# Patient Record
Sex: FEMALE | Race: WHITE | NOT HISPANIC OR LATINO | Employment: OTHER | URBAN - METROPOLITAN AREA
[De-identification: names, ages, dates, MRNs, and addresses within clinical notes are randomized per-mention and may not be internally consistent; named-entity substitution may affect disease eponyms.]

---

## 2017-01-04 ENCOUNTER — OFFICE VISIT (OUTPATIENT)
Dept: ONCOLOGY | Facility: CLINIC | Age: 82
End: 2017-01-04

## 2017-01-04 ENCOUNTER — APPOINTMENT (OUTPATIENT)
Dept: ONCOLOGY | Facility: HOSPITAL | Age: 82
End: 2017-01-04

## 2017-01-04 ENCOUNTER — LAB (OUTPATIENT)
Dept: LAB | Facility: HOSPITAL | Age: 82
End: 2017-01-04

## 2017-01-04 VITALS
WEIGHT: 143.4 LBS | OXYGEN SATURATION: 95 % | TEMPERATURE: 97.9 F | HEART RATE: 86 BPM | BODY MASS INDEX: 30.94 KG/M2 | HEIGHT: 57 IN | SYSTOLIC BLOOD PRESSURE: 128 MMHG | DIASTOLIC BLOOD PRESSURE: 70 MMHG | RESPIRATION RATE: 16 BRPM

## 2017-01-04 DIAGNOSIS — E61.1 IRON DEFICIENCY: Primary | ICD-10-CM

## 2017-01-04 DIAGNOSIS — N18.9 CKD (CHRONIC KIDNEY DISEASE), UNSPECIFIED STAGE: ICD-10-CM

## 2017-01-04 LAB
BASOPHILS # BLD AUTO: 0.07 10*3/MM3 (ref 0–0.1)
BASOPHILS NFR BLD AUTO: 1.2 % (ref 0–1.1)
DEPRECATED RDW RBC AUTO: 50.1 FL (ref 37–49)
EOSINOPHIL # BLD AUTO: 0.26 10*3/MM3 (ref 0–0.36)
EOSINOPHIL NFR BLD AUTO: 4.4 % (ref 1–5)
ERYTHROCYTE [DISTWIDTH] IN BLOOD BY AUTOMATED COUNT: 13.5 % (ref 11.7–14.5)
HCT VFR BLD AUTO: 30.4 % (ref 34–45)
HGB BLD-MCNC: 10.1 G/DL (ref 11.5–14.9)
IMM GRANULOCYTES # BLD: 0.02 10*3/MM3 (ref 0–0.03)
IMM GRANULOCYTES NFR BLD: 0.3 % (ref 0–0.5)
LYMPHOCYTES # BLD AUTO: 1.35 10*3/MM3 (ref 1–3.5)
LYMPHOCYTES NFR BLD AUTO: 23 % (ref 20–49)
MCH RBC QN AUTO: 33.4 PG (ref 27–33)
MCHC RBC AUTO-ENTMCNC: 33.2 G/DL (ref 32–35)
MCV RBC AUTO: 100.7 FL (ref 83–97)
MONOCYTES # BLD AUTO: 0.47 10*3/MM3 (ref 0.25–0.8)
MONOCYTES NFR BLD AUTO: 8 % (ref 4–12)
NEUTROPHILS # BLD AUTO: 3.69 10*3/MM3 (ref 1.5–7)
NEUTROPHILS NFR BLD AUTO: 63.1 % (ref 39–75)
NRBC BLD MANUAL-RTO: 0 /100 WBC (ref 0–0)
PLATELET # BLD AUTO: 225 10*3/MM3 (ref 150–375)
PMV BLD AUTO: 9.8 FL (ref 8.9–12.1)
RBC # BLD AUTO: 3.02 10*6/MM3 (ref 3.9–5)
WBC NRBC COR # BLD: 5.86 10*3/MM3 (ref 4–10)

## 2017-01-04 PROCEDURE — 99213 OFFICE O/P EST LOW 20 MIN: CPT | Performed by: INTERNAL MEDICINE

## 2017-01-04 PROCEDURE — 85025 COMPLETE CBC W/AUTO DIFF WBC: CPT

## 2017-01-04 PROCEDURE — 36416 COLLJ CAPILLARY BLOOD SPEC: CPT

## 2017-01-04 NOTE — PROGRESS NOTES
DOS: 10/14/16  Subjective     REASONS FOR FOLLOW-UP: Multifactorial anemia secondary to Stage III chronic kidney disease and iron deficiency.  On intermittent Procrit.  Receives IV iron replacement as needed.    HISTORY OF PRESENT ILLNESS:     History of Present Illness     Mr. Blackburn is a pleasant 86 year old female who is here today for follow-up.  Recently her ferritin was found to be 64 and therefore she required Feraheme once again.  Her repeat ferritin level done 8/9/16 had improved up to 589.    She returns today accompanied by her daughter. Her main complaint is ongoing knee pain. She has significant arthritis in her knees and is followed by Dr. Young.    We reviewed her lab work today showing Hgb of 10.1.  She had been on monthly procrit.  However her last dose of Procrit has been on 9/17/2016.  Her hemoglobin though is gradually dropping from 11-10.6-10.1.  We will continue to schedule her for every 6 weeks Procrit if needed.  Her ferritin has been normal.    Past Medical History, Past Surgical History, Social History, Family History have been reviewed and are without significant changes except as mentioned.    Review of Systems   Constitutional: Positive for fatigue.   HENT: Negative.    Eyes: Negative.    Respiratory: Negative.    Cardiovascular: Negative.    Gastrointestinal: Negative.    Endocrine: Negative.    Genitourinary: Negative.    Musculoskeletal: Positive for arthralgias and joint swelling.   Skin: Negative.    Allergic/Immunologic: Negative.    Neurological: Negative.    Hematological: Negative.    Psychiatric/Behavioral: Negative.       A comprehensive 14 point review of systems was performed and was negative except as mentioned.    Medications:  The current medication list was reviewed in the EMR    ALLERGIES:  No Known Allergies    Objective      Vitals:    01/04/17 1033   BP: 128/70   Pulse: 86   Resp: 16   Temp: 97.9 °F (36.6 °C)   TempSrc: Oral   SpO2: 95%   Weight: 143 lb 6.4  "oz (65 kg)   Height: 57.28\" (145.5 cm)   PainSc: 0-No pain     Current Status 1/4/2017   ECOG score 0       Physical Exam   Constitutional: She is oriented to person, place, and time. She appears well-developed and well-nourished.   Seen today with daughter using walker ambulation.   Eyes: Conjunctivae and EOM are normal. Pupils are equal, round, and reactive to light.   Neck: Normal range of motion. Neck supple.   Cardiovascular: Normal rate and regular rhythm.    Murmur heard.   Systolic murmur is present with a grade of 2/6   Pulmonary/Chest: Effort normal and breath sounds normal.   Abdominal: Soft. Bowel sounds are normal. She exhibits no mass. There is no tenderness. There is no guarding.   Musculoskeletal: Normal range of motion.   Neurological: She is alert and oriented to person, place, and time.   Skin: Skin is warm and dry. No rash noted.   Psychiatric: She has a normal mood and affect. Her behavior is normal.   Nursing note and vitals reviewed.        RECENT LABS:  Hematology WBC   Date Value Ref Range Status   01/04/2017 5.86 4.00 - 10.00 10*3/mm3 Final     RBC   Date Value Ref Range Status   01/04/2017 3.02 (L) 3.90 - 5.00 10*6/mm3 Final     HEMOGLOBIN   Date Value Ref Range Status   01/04/2017 10.1 (L) 11.5 - 14.9 g/dL Final     HEMATOCRIT   Date Value Ref Range Status   01/04/2017 30.4 (L) 34.0 - 45.0 % Final     PLATELETS   Date Value Ref Range Status   01/04/2017 225 150 - 375 10*3/mm3 Final          Assessment/Plan    Assessment:  1. Multifactorial anemia with anemia of Stage III chronic kidney disease and iron deficiency. Currently receiving Procrit on a monthly basis per protocol, typical dose 8000 units.today we will hold procrit given hg of 10.0.  Last dose of Procrit received 9/17/2016.  We will continue to keep her on Procrit every 6 weeks if eligible.    2.  Recurrent iron deficiency, now resolved.     3.  Fatigue related to anemia, chronic and stable.    Plan:  1. Hold Procrit  today.  2.  " Patient to return in 6 weeks for M.D. follow-up and possible Procrit.  3. Terrence serrato in 3 months.        Karrie Morris MD        1/4/2017

## 2017-02-16 ENCOUNTER — APPOINTMENT (OUTPATIENT)
Dept: ONCOLOGY | Facility: HOSPITAL | Age: 82
End: 2017-02-16

## 2017-02-16 ENCOUNTER — LAB (OUTPATIENT)
Dept: LAB | Facility: HOSPITAL | Age: 82
End: 2017-02-16

## 2017-02-16 ENCOUNTER — OFFICE VISIT (OUTPATIENT)
Dept: ONCOLOGY | Facility: CLINIC | Age: 82
End: 2017-02-16

## 2017-02-16 VITALS
BODY MASS INDEX: 30.29 KG/M2 | HEIGHT: 57 IN | OXYGEN SATURATION: 99 % | HEART RATE: 74 BPM | WEIGHT: 140.4 LBS | RESPIRATION RATE: 16 BRPM | DIASTOLIC BLOOD PRESSURE: 80 MMHG | SYSTOLIC BLOOD PRESSURE: 130 MMHG | TEMPERATURE: 98 F

## 2017-02-16 DIAGNOSIS — D50.9 IRON DEFICIENCY ANEMIA, UNSPECIFIED IRON DEFICIENCY ANEMIA TYPE: Primary | ICD-10-CM

## 2017-02-16 DIAGNOSIS — N18.9 CHRONIC KIDNEY DISEASE, UNSPECIFIED STAGE: ICD-10-CM

## 2017-02-16 LAB
BASOPHILS # BLD AUTO: 0.06 10*3/MM3 (ref 0–0.1)
BASOPHILS NFR BLD AUTO: 0.7 % (ref 0–1.1)
DEPRECATED RDW RBC AUTO: 50.5 FL (ref 37–49)
EOSINOPHIL # BLD AUTO: 0.56 10*3/MM3 (ref 0–0.36)
EOSINOPHIL NFR BLD AUTO: 7 % (ref 1–5)
ERYTHROCYTE [DISTWIDTH] IN BLOOD BY AUTOMATED COUNT: 13.5 % (ref 11.7–14.5)
HCT VFR BLD AUTO: 30.1 % (ref 34–45)
HGB BLD-MCNC: 10 G/DL (ref 11.5–14.9)
IMM GRANULOCYTES # BLD: 0.04 10*3/MM3 (ref 0–0.03)
IMM GRANULOCYTES NFR BLD: 0.5 % (ref 0–0.5)
LYMPHOCYTES # BLD AUTO: 1.71 10*3/MM3 (ref 1–3.5)
LYMPHOCYTES NFR BLD AUTO: 21.2 % (ref 20–49)
MCH RBC QN AUTO: 33.8 PG (ref 27–33)
MCHC RBC AUTO-ENTMCNC: 33.2 G/DL (ref 32–35)
MCV RBC AUTO: 101.7 FL (ref 83–97)
MONOCYTES # BLD AUTO: 0.77 10*3/MM3 (ref 0.25–0.8)
MONOCYTES NFR BLD AUTO: 9.6 % (ref 4–12)
NEUTROPHILS # BLD AUTO: 4.91 10*3/MM3 (ref 1.5–7)
NEUTROPHILS NFR BLD AUTO: 61 % (ref 39–75)
NRBC BLD MANUAL-RTO: 0 /100 WBC (ref 0–0)
PLATELET # BLD AUTO: 250 10*3/MM3 (ref 150–375)
PMV BLD AUTO: 10 FL (ref 8.9–12.1)
RBC # BLD AUTO: 2.96 10*6/MM3 (ref 3.9–5)
WBC NRBC COR # BLD: 8.05 10*3/MM3 (ref 4–10)

## 2017-02-16 PROCEDURE — 36416 COLLJ CAPILLARY BLOOD SPEC: CPT

## 2017-02-16 PROCEDURE — 85025 COMPLETE CBC W/AUTO DIFF WBC: CPT

## 2017-02-16 PROCEDURE — 99213 OFFICE O/P EST LOW 20 MIN: CPT | Performed by: NURSE PRACTITIONER

## 2017-02-16 NOTE — PROGRESS NOTES
"      DOS: 10/14/16  Subjective     REASONS FOR FOLLOW-UP: Multifactorial anemia secondary to Stage III chronic kidney disease and iron deficiency.  On intermittent Procrit.  Receives IV iron replacement as needed.    HISTORY OF PRESENT ILLNESS:     History of Present Illness     Mr. Blackburn is a pleasant 86 year old female who is here today for follow-up of the above-mentioned history, accompanied by her daughter. Her main complaint is chronic, osteoarthritic knee pain.  This is managed by Dr. Young.  She receives Prolia for treatment of osteoporosis every 6 months.  Otherwise, she feels quite well.  She denies shortness of breath, chest pain, or excessive bleeding or bruising.  She remains chronically fatigued.        Past Medical History, Past Surgical History, Social History, Family History have been reviewed and are without significant changes except as mentioned.    Review of Systems   Constitutional: Positive for fatigue.   HENT: Negative.    Eyes: Negative.    Respiratory: Negative.    Cardiovascular: Negative.    Gastrointestinal: Negative.    Endocrine: Negative.    Genitourinary: Negative.    Musculoskeletal: Positive for arthralgias and joint swelling.   Skin: Negative.    Allergic/Immunologic: Negative.    Neurological: Negative.    Hematological: Negative.    Psychiatric/Behavioral: Negative.       A comprehensive 14 point review of systems was performed and was negative except as mentioned.    Medications:  The current medication list was reviewed in the EMR    ALLERGIES:  No Known Allergies    Objective      Vitals:    02/16/17 1330   BP: 130/80   Pulse: 74   Resp: 16   Temp: 98 °F (36.7 °C)   TempSrc: Oral   SpO2: 99%   Weight: 140 lb 6.4 oz (63.7 kg)   Height: 57.28\" (145.5 cm)   PainSc: 0-No pain     Current Status 2/16/2017   ECOG score 1       Physical Exam   Constitutional: She is oriented to person, place, and time. She appears well-developed and well-nourished.   Seen today with daughter " using walker ambulation.   Eyes: Conjunctivae and EOM are normal. Pupils are equal, round, and reactive to light.   Neck: Normal range of motion. Neck supple.   Cardiovascular: Normal rate and regular rhythm.    Murmur heard.   Systolic murmur is present with a grade of 2/6   Pulmonary/Chest: Effort normal and breath sounds normal.   Abdominal: Soft. Bowel sounds are normal. She exhibits no mass. There is no tenderness. There is no guarding.   Musculoskeletal: Normal range of motion.   Neurological: She is alert and oriented to person, place, and time.   Skin: Skin is warm and dry. No rash noted.   Psychiatric: She has a normal mood and affect. Her behavior is normal.   Nursing note and vitals reviewed.        RECENT LABS:  Hematology WBC   Date Value Ref Range Status   02/16/2017 8.05 4.00 - 10.00 10*3/mm3 Final     RBC   Date Value Ref Range Status   02/16/2017 2.96 (L) 3.90 - 5.00 10*6/mm3 Final     HEMOGLOBIN   Date Value Ref Range Status   02/16/2017 10.0 (L) 11.5 - 14.9 g/dL Final     HEMATOCRIT   Date Value Ref Range Status   02/16/2017 30.1 (L) 34.0 - 45.0 % Final     PLATELETS   Date Value Ref Range Status   02/16/2017 250 150 - 375 10*3/mm3 Final          Assessment/Plan    Assessment:  1. Multifactorial anemia with anemia of Stage III chronic kidney disease and iron deficiency. Lab work has been reviewed today and hemoglobin has declined from 11.0 to 10.0.  She has been on every 6 week Procrit.  However, she has not required Procrit since September 6, 2016  as her hemoglobin has remained at or above 10.0.  She, again; will not require Procrit today.  We will continue to monitor a CBC every 6 weeks with the possibility of Procrit at that time. She will see Dr. Morris in follow-up on March 30, 2017, at which time we will also draw iron studies.  She was instructed to call our office with worsening fatigue, shortness of breath, chest pain, or with any other issues or concerns prior to her next office  visit.      2.  Recurrent iron deficiency, now resolved.     3.  Fatigue related to anemia, chronic and stable.  Remains unchanged today.    Plan:  1. Hold Procrit  today.  2.  Patient to return in 6 weeks for M.D. follow-up with Dr. Morris, CBC, and repeat iron studies.   3.  She was instructed to call our office with worsening fatigue, shortness of breath, chest pain, or any other issues of concern prior to her next office visit.        Anh Blankenship, AUDREY        2/16/2017

## 2017-03-30 ENCOUNTER — APPOINTMENT (OUTPATIENT)
Dept: ONCOLOGY | Facility: HOSPITAL | Age: 82
End: 2017-03-30

## 2017-03-30 ENCOUNTER — LAB (OUTPATIENT)
Dept: LAB | Facility: HOSPITAL | Age: 82
End: 2017-03-30

## 2017-03-30 ENCOUNTER — OFFICE VISIT (OUTPATIENT)
Dept: ONCOLOGY | Facility: CLINIC | Age: 82
End: 2017-03-30

## 2017-03-30 VITALS
BODY MASS INDEX: 30.42 KG/M2 | RESPIRATION RATE: 16 BRPM | HEIGHT: 57 IN | HEART RATE: 72 BPM | TEMPERATURE: 98.3 F | WEIGHT: 141 LBS | DIASTOLIC BLOOD PRESSURE: 70 MMHG | SYSTOLIC BLOOD PRESSURE: 120 MMHG

## 2017-03-30 DIAGNOSIS — D50.9 IRON DEFICIENCY ANEMIA, UNSPECIFIED IRON DEFICIENCY ANEMIA TYPE: Primary | ICD-10-CM

## 2017-03-30 DIAGNOSIS — N18.9 CHRONIC KIDNEY DISEASE, UNSPECIFIED STAGE: ICD-10-CM

## 2017-03-30 DIAGNOSIS — D50.9 IRON DEFICIENCY ANEMIA, UNSPECIFIED IRON DEFICIENCY ANEMIA TYPE: ICD-10-CM

## 2017-03-30 DIAGNOSIS — Z86.2 HISTORY OF ANEMIA DUE TO CKD: Primary | ICD-10-CM

## 2017-03-30 DIAGNOSIS — N18.9 HISTORY OF ANEMIA DUE TO CKD: Primary | ICD-10-CM

## 2017-03-30 LAB
ALBUMIN SERPL-MCNC: 4.4 G/DL (ref 3.5–5.2)
ALBUMIN/GLOB SERPL: 1.5 G/DL (ref 1.1–2.4)
ALP SERPL-CCNC: 40 U/L (ref 38–116)
ALT SERPL W P-5'-P-CCNC: 10 U/L (ref 0–33)
ANION GAP SERPL CALCULATED.3IONS-SCNC: 13.3 MMOL/L
AST SERPL-CCNC: 18 U/L (ref 0–32)
BASOPHILS # BLD AUTO: 0.05 10*3/MM3 (ref 0–0.1)
BASOPHILS NFR BLD AUTO: 0.7 % (ref 0–1.1)
BILIRUB SERPL-MCNC: 0.6 MG/DL (ref 0.1–1.2)
BUN BLD-MCNC: 28 MG/DL (ref 6–20)
BUN/CREAT SERPL: 23.5 (ref 7.3–30)
CALCIUM SPEC-SCNC: 10.7 MG/DL (ref 8.5–10.2)
CHLORIDE SERPL-SCNC: 103 MMOL/L (ref 98–107)
CO2 SERPL-SCNC: 22.7 MMOL/L (ref 22–29)
CREAT BLD-MCNC: 1.19 MG/DL (ref 0.6–1.1)
DEPRECATED RDW RBC AUTO: 55.3 FL (ref 37–49)
EOSINOPHIL # BLD AUTO: 0.42 10*3/MM3 (ref 0–0.36)
EOSINOPHIL NFR BLD AUTO: 6.2 % (ref 1–5)
ERYTHROCYTE [DISTWIDTH] IN BLOOD BY AUTOMATED COUNT: 14.6 % (ref 11.7–14.5)
FERRITIN SERPL-MCNC: 319.4 NG/ML
GFR SERPL CREATININE-BSD FRML MDRD: 43 ML/MIN/1.73
GLOBULIN UR ELPH-MCNC: 3 GM/DL (ref 1.8–3.5)
GLUCOSE BLD-MCNC: 102 MG/DL (ref 74–124)
HCT VFR BLD AUTO: 32 % (ref 34–45)
HGB BLD-MCNC: 10.1 G/DL (ref 11.5–14.9)
IMM GRANULOCYTES # BLD: 0.05 10*3/MM3 (ref 0–0.03)
IMM GRANULOCYTES NFR BLD: 0.7 % (ref 0–0.5)
IRON 24H UR-MRATE: 96 MCG/DL (ref 37–145)
IRON SATN MFR SERPL: 22 % (ref 14–48)
LYMPHOCYTES # BLD AUTO: 1.42 10*3/MM3 (ref 1–3.5)
LYMPHOCYTES NFR BLD AUTO: 20.9 % (ref 20–49)
MCH RBC QN AUTO: 32.9 PG (ref 27–33)
MCHC RBC AUTO-ENTMCNC: 31.6 G/DL (ref 32–35)
MCV RBC AUTO: 104.2 FL (ref 83–97)
MONOCYTES # BLD AUTO: 0.54 10*3/MM3 (ref 0.25–0.8)
MONOCYTES NFR BLD AUTO: 7.9 % (ref 4–12)
NEUTROPHILS # BLD AUTO: 4.33 10*3/MM3 (ref 1.5–7)
NEUTROPHILS NFR BLD AUTO: 63.6 % (ref 39–75)
NRBC BLD MANUAL-RTO: 0 /100 WBC (ref 0–0)
PLATELET # BLD AUTO: 285 10*3/MM3 (ref 150–375)
PMV BLD AUTO: 9.4 FL (ref 8.9–12.1)
POTASSIUM BLD-SCNC: 5.6 MMOL/L (ref 3.5–4.7)
PROT SERPL-MCNC: 7.4 G/DL (ref 6.3–8)
RBC # BLD AUTO: 3.07 10*6/MM3 (ref 3.9–5)
SODIUM BLD-SCNC: 139 MMOL/L (ref 134–145)
TIBC SERPL-MCNC: 442 MCG/DL (ref 249–505)
TRANSFERRIN SERPL-MCNC: 316 MG/DL (ref 200–360)
WBC NRBC COR # BLD: 6.81 10*3/MM3 (ref 4–10)

## 2017-03-30 PROCEDURE — 99213 OFFICE O/P EST LOW 20 MIN: CPT | Performed by: INTERNAL MEDICINE

## 2017-03-30 PROCEDURE — 85025 COMPLETE CBC W/AUTO DIFF WBC: CPT

## 2017-03-30 PROCEDURE — 36415 COLL VENOUS BLD VENIPUNCTURE: CPT

## 2017-03-30 PROCEDURE — 84466 ASSAY OF TRANSFERRIN: CPT

## 2017-03-30 PROCEDURE — 82728 ASSAY OF FERRITIN: CPT

## 2017-03-30 PROCEDURE — 80053 COMPREHEN METABOLIC PANEL: CPT

## 2017-03-30 PROCEDURE — 83540 ASSAY OF IRON: CPT

## 2017-03-30 NOTE — PROGRESS NOTES
DOS: 10/14/16  Subjective     REASONS FOR FOLLOW-UP: Multifactorial anemia secondary to Stage III chronic kidney disease and iron deficiency.  On intermittent Procrit.  Receives IV iron replacement as needed.    HISTORY OF PRESENT ILLNESS:     History of Present Illness     Mr. Blackburn is a pleasant 86 year old female who is here today for follow-up.  Recently her ferritin was found to be 64 and therefore she required Feraheme once again.  Her repeat ferritin level done 8/9/16 had improved up to 589.    She returns today accompanied by her daughter. Her main complaint is ongoing knee pain. She has significant arthritis in her knees and is followed by Dr. Young.    We reviewed her lab work today showing Hgb of 10.1.  She had been on monthly procrit.  However her last dose of Procrit has been on 9/17/2016.  Her hemoglobin though is gradually dropping from 11-10.6-10.1.  We will continue to schedule her for every 6 weeks Procrit if needed.  Her ferritin has been normal.  Patient has been on Procrit but hasn't required it for the last few times.  Today her hemoglobin is 10.1 and she will not require it.  Last dose of Procrit was September 2016.    Past Medical History, Past Surgical History, Social History, Family History have been reviewed and are without significant changes except as mentioned.    Review of Systems   Constitutional: Positive for fatigue.   HENT: Negative.    Eyes: Negative.    Respiratory: Negative.    Cardiovascular: Negative.    Gastrointestinal: Negative.    Endocrine: Negative.    Genitourinary: Negative.    Musculoskeletal: Positive for arthralgias and joint swelling.   Skin: Negative.    Allergic/Immunologic: Negative.    Neurological: Negative.    Hematological: Negative.    Psychiatric/Behavioral: Negative.       A comprehensive 14 point review of systems was performed and was negative except as mentioned.    Medications:  The current medication list was reviewed in the  "EMR    ALLERGIES:  No Known Allergies    Objective      Vitals:    03/30/17 1407   BP: 120/70   Pulse: 72   Resp: 16   Temp: 98.3 °F (36.8 °C)   Weight: 141 lb (64 kg)   Height: 57.28\" (145.5 cm)   PainSc: 0-No pain     Current Status 3/30/2017   ECOG score 0       Physical Exam   Constitutional: She is oriented to person, place, and time. She appears well-developed and well-nourished.   Seen today with daughter using walker ambulation.   Eyes: Conjunctivae and EOM are normal. Pupils are equal, round, and reactive to light.   Neck: Normal range of motion. Neck supple.   Cardiovascular: Normal rate and regular rhythm.    Murmur heard.   Systolic murmur is present with a grade of 2/6   Pulmonary/Chest: Effort normal and breath sounds normal.   Abdominal: Soft. Bowel sounds are normal. She exhibits no mass. There is no tenderness. There is no guarding.   Musculoskeletal: Normal range of motion.   Neurological: She is alert and oriented to person, place, and time.   Skin: Skin is warm and dry. No rash noted.   Psychiatric: She has a normal mood and affect. Her behavior is normal.   Nursing note and vitals reviewed.        RECENT LABS:  Hematology WBC   Date Value Ref Range Status   03/30/2017 6.81 4.00 - 10.00 10*3/mm3 Final     RBC   Date Value Ref Range Status   03/30/2017 3.07 (L) 3.90 - 5.00 10*6/mm3 Final     Hemoglobin   Date Value Ref Range Status   03/30/2017 10.1 (L) 11.5 - 14.9 g/dL Final     Hematocrit   Date Value Ref Range Status   03/30/2017 32.0 (L) 34.0 - 45.0 % Final     Platelets   Date Value Ref Range Status   03/30/2017 285 150 - 375 10*3/mm3 Final          Assessment/Plan    Assessment:  1. Multifactorial anemia with anemia of Stage III chronic kidney disease and iron deficiency. Currently receiving Procrit on a monthly basis per protocol, typical dose 8000 units.today we will hold procrit given hg of 10.0.  Last dose of Procrit received 9/17/2016.  We will continue to keep her on Procrit every 6 " weeks if eligible.  If in the next 3 months she's not eligible for any Procrit administration because of hemoglobin being about 10 we can stop giving Procrit.    2.  Recurrent iron deficiency, now resolved.     3.  Fatigue related to anemia, chronic and stable.    Plan:  1. Hold Procrit  today.  2.  Patient to return in 6 weeks for M.D. follow-up and possible Procrit.  3. Fu md in 3 months her labs and Procrit.        Karrie Morris MD        3/30/2017

## 2017-04-03 ENCOUNTER — TELEPHONE (OUTPATIENT)
Dept: ONCOLOGY | Facility: HOSPITAL | Age: 82
End: 2017-04-03

## 2017-04-03 DIAGNOSIS — E87.5 HYPERKALEMIA: ICD-10-CM

## 2017-04-03 DIAGNOSIS — E83.52 HYPERCALCEMIA: ICD-10-CM

## 2017-04-03 DIAGNOSIS — D50.9 IRON DEFICIENCY ANEMIA, UNSPECIFIED IRON DEFICIENCY ANEMIA TYPE: Primary | ICD-10-CM

## 2017-04-03 NOTE — PROGRESS NOTES
Patient saw Dr. Morris on 3/30/2017 at which time her potassium was found to be elevated at 5.6. Her was also hypercalcemic with a calcium level of 10.7.  I reviewed these labs with Dr. Morris and we have asked patient to stop lisinopril and to avoid potassium and calcium rich foods.  She will return to our office tomorrow for a stat BMP and CBC with RN review.  The RN will show the labs to Dr. Morris for further evaluation and instructions.  Patient has verbalized understanding.

## 2017-04-03 NOTE — TELEPHONE ENCOUNTER
----- Message from AUDREY Lux sent at 4/3/2017  3:03 PM EDT -----  Please schedule patient for stat CBC and BMP with RN review tomorrow.  RN is to show labs to Dr. Morris.    Thanks so much!  Anh

## 2017-04-04 ENCOUNTER — CLINICAL SUPPORT (OUTPATIENT)
Dept: ONCOLOGY | Facility: HOSPITAL | Age: 82
End: 2017-04-04

## 2017-04-04 ENCOUNTER — LAB (OUTPATIENT)
Dept: LAB | Facility: HOSPITAL | Age: 82
End: 2017-04-04

## 2017-04-04 DIAGNOSIS — D50.9 IRON DEFICIENCY ANEMIA, UNSPECIFIED IRON DEFICIENCY ANEMIA TYPE: ICD-10-CM

## 2017-04-04 DIAGNOSIS — E83.52 HYPERCALCEMIA: ICD-10-CM

## 2017-04-04 DIAGNOSIS — E87.5 HYPERKALEMIA: ICD-10-CM

## 2017-04-04 LAB
ANION GAP SERPL CALCULATED.3IONS-SCNC: 12.5 MMOL/L
BASOPHILS # BLD AUTO: 0.08 10*3/MM3 (ref 0–0.1)
BASOPHILS NFR BLD AUTO: 1.1 % (ref 0–1.1)
BUN BLD-MCNC: 31 MG/DL (ref 6–20)
BUN/CREAT SERPL: 24.8 (ref 7.3–30)
CALCIUM SPEC-SCNC: 10.5 MG/DL (ref 8.5–10.2)
CHLORIDE SERPL-SCNC: 105 MMOL/L (ref 98–107)
CO2 SERPL-SCNC: 23.5 MMOL/L (ref 22–29)
CREAT BLD-MCNC: 1.25 MG/DL (ref 0.6–1.1)
DEPRECATED RDW RBC AUTO: 57.3 FL (ref 37–49)
EOSINOPHIL # BLD AUTO: 0.48 10*3/MM3 (ref 0–0.36)
EOSINOPHIL NFR BLD AUTO: 6.3 % (ref 1–5)
ERYTHROCYTE [DISTWIDTH] IN BLOOD BY AUTOMATED COUNT: 14.7 % (ref 11.7–14.5)
GFR SERPL CREATININE-BSD FRML MDRD: 41 ML/MIN/1.73
GLUCOSE BLD-MCNC: 119 MG/DL (ref 74–124)
HCT VFR BLD AUTO: 30.6 % (ref 34–45)
HGB BLD-MCNC: 9.7 G/DL (ref 11.5–14.9)
IMM GRANULOCYTES # BLD: 0.03 10*3/MM3 (ref 0–0.03)
IMM GRANULOCYTES NFR BLD: 0.4 % (ref 0–0.5)
LYMPHOCYTES # BLD AUTO: 1.71 10*3/MM3 (ref 1–3.5)
LYMPHOCYTES NFR BLD AUTO: 22.5 % (ref 20–49)
MCH RBC QN AUTO: 33.6 PG (ref 27–33)
MCHC RBC AUTO-ENTMCNC: 31.7 G/DL (ref 32–35)
MCV RBC AUTO: 105.9 FL (ref 83–97)
MONOCYTES # BLD AUTO: 0.65 10*3/MM3 (ref 0.25–0.8)
MONOCYTES NFR BLD AUTO: 8.5 % (ref 4–12)
NEUTROPHILS # BLD AUTO: 4.66 10*3/MM3 (ref 1.5–7)
NEUTROPHILS NFR BLD AUTO: 61.2 % (ref 39–75)
NRBC BLD MANUAL-RTO: 0 /100 WBC (ref 0–0)
PLATELET # BLD AUTO: 310 10*3/MM3 (ref 150–375)
PMV BLD AUTO: 9.6 FL (ref 8.9–12.1)
POTASSIUM BLD-SCNC: 4.8 MMOL/L (ref 3.5–4.7)
RBC # BLD AUTO: 2.89 10*6/MM3 (ref 3.9–5)
SODIUM BLD-SCNC: 141 MMOL/L (ref 134–145)
WBC NRBC COR # BLD: 7.61 10*3/MM3 (ref 4–10)

## 2017-04-04 PROCEDURE — 36415 COLL VENOUS BLD VENIPUNCTURE: CPT

## 2017-04-04 PROCEDURE — 85025 COMPLETE CBC W/AUTO DIFF WBC: CPT

## 2017-04-04 PROCEDURE — 80048 BASIC METABOLIC PNL TOTAL CA: CPT

## 2017-04-04 NOTE — PROGRESS NOTES
CBC and BMP reviewed with pt and daughter. Copy of labs given to them. WBC 7.61, HGB 9.7, platelets 310, potassium 4.8, calcium 10.5. Pt and daughter state that pt has been held lisinopril today for labs and continues to drink a large amount of milk. Daughter states that pt continued to eat high potassium foods and did not decrease any calcium intake. Only complaint is that she feels tired. Pt states she has an appt to see her PCP next Tuesday. Labs reviewed with Dr. Morris.     Dr. Morris aware. No new orders

## 2017-05-11 ENCOUNTER — LAB (OUTPATIENT)
Dept: LAB | Facility: HOSPITAL | Age: 82
End: 2017-05-11

## 2017-05-11 ENCOUNTER — INFUSION (OUTPATIENT)
Dept: ONCOLOGY | Facility: HOSPITAL | Age: 82
End: 2017-05-11

## 2017-05-11 DIAGNOSIS — N18.9 CHRONIC KIDNEY DISEASE, UNSPECIFIED STAGE: Primary | ICD-10-CM

## 2017-05-11 DIAGNOSIS — Z86.2 HISTORY OF ANEMIA DUE TO CKD: ICD-10-CM

## 2017-05-11 DIAGNOSIS — D50.8 OTHER IRON DEFICIENCY ANEMIA: ICD-10-CM

## 2017-05-11 DIAGNOSIS — N18.9 HISTORY OF ANEMIA DUE TO CKD: ICD-10-CM

## 2017-05-11 LAB
BASOPHILS # BLD AUTO: 0.08 10*3/MM3 (ref 0–0.1)
BASOPHILS NFR BLD AUTO: 1.1 % (ref 0–1.1)
DEPRECATED RDW RBC AUTO: 53.4 FL (ref 37–49)
EOSINOPHIL # BLD AUTO: 0.44 10*3/MM3 (ref 0–0.36)
EOSINOPHIL NFR BLD AUTO: 5.8 % (ref 1–5)
ERYTHROCYTE [DISTWIDTH] IN BLOOD BY AUTOMATED COUNT: 13.7 % (ref 11.7–14.5)
HCT VFR BLD AUTO: 27.7 % (ref 34–45)
HGB BLD-MCNC: 9 G/DL (ref 11.5–14.9)
IMM GRANULOCYTES # BLD: 0.04 10*3/MM3 (ref 0–0.03)
IMM GRANULOCYTES NFR BLD: 0.5 % (ref 0–0.5)
LYMPHOCYTES # BLD AUTO: 1.82 10*3/MM3 (ref 1–3.5)
LYMPHOCYTES NFR BLD AUTO: 24 % (ref 20–49)
MCH RBC QN AUTO: 34.5 PG (ref 27–33)
MCHC RBC AUTO-ENTMCNC: 32.5 G/DL (ref 32–35)
MCV RBC AUTO: 106.1 FL (ref 83–97)
MONOCYTES # BLD AUTO: 0.55 10*3/MM3 (ref 0.25–0.8)
MONOCYTES NFR BLD AUTO: 7.2 % (ref 4–12)
NEUTROPHILS # BLD AUTO: 4.66 10*3/MM3 (ref 1.5–7)
NEUTROPHILS NFR BLD AUTO: 61.4 % (ref 39–75)
NRBC BLD MANUAL-RTO: 0 /100 WBC (ref 0–0)
PLATELET # BLD AUTO: 277 10*3/MM3 (ref 150–375)
PMV BLD AUTO: 10.5 FL (ref 8.9–12.1)
RBC # BLD AUTO: 2.61 10*6/MM3 (ref 3.9–5)
WBC NRBC COR # BLD: 7.59 10*3/MM3 (ref 4–10)

## 2017-05-11 PROCEDURE — 63510000001 EPOETIN ALFA PER 1000 UNITS: Performed by: NURSE PRACTITIONER

## 2017-05-11 PROCEDURE — 36416 COLLJ CAPILLARY BLOOD SPEC: CPT

## 2017-05-11 PROCEDURE — 85025 COMPLETE CBC W/AUTO DIFF WBC: CPT

## 2017-05-11 PROCEDURE — 96372 THER/PROPH/DIAG INJ SC/IM: CPT | Performed by: NURSE PRACTITIONER

## 2017-05-11 RX ADMIN — ERYTHROPOIETIN 8000 UNITS: 20000 INJECTION, SOLUTION INTRAVENOUS; SUBCUTANEOUS at 13:58
